# Patient Record
Sex: MALE
[De-identification: names, ages, dates, MRNs, and addresses within clinical notes are randomized per-mention and may not be internally consistent; named-entity substitution may affect disease eponyms.]

---

## 2018-02-05 ENCOUNTER — HOSPITAL ENCOUNTER (OUTPATIENT)
Dept: HOSPITAL 31 - C.SDS | Age: 48
Discharge: HOME | End: 2018-02-05
Attending: SURGERY
Payer: COMMERCIAL

## 2018-02-05 VITALS — BODY MASS INDEX: 25.4 KG/M2

## 2018-02-05 VITALS — SYSTOLIC BLOOD PRESSURE: 100 MMHG | TEMPERATURE: 97 F | DIASTOLIC BLOOD PRESSURE: 82 MMHG | HEART RATE: 68 BPM

## 2018-02-05 VITALS — RESPIRATION RATE: 18 BRPM | OXYGEN SATURATION: 100 %

## 2018-02-05 DIAGNOSIS — D17.6: ICD-10-CM

## 2018-02-05 DIAGNOSIS — K40.20: Primary | ICD-10-CM

## 2018-02-05 PROCEDURE — 55520 REMOVAL OF SPERM CORD LESION: CPT

## 2018-02-05 PROCEDURE — 88304 TISSUE EXAM BY PATHOLOGIST: CPT

## 2018-02-05 PROCEDURE — 49650 LAP ING HERNIA REPAIR INIT: CPT

## 2018-02-05 RX ADMIN — CEFAZOLIN SODIUM ONE MLS: 2 SOLUTION INTRAVENOUS at 07:46

## 2018-02-05 RX ADMIN — LIDOCAINE HYDROCHLORIDE AND EPINEPHRINE ONE ML: 10; 10 INJECTION, SOLUTION INFILTRATION; PERINEURAL at 08:31

## 2018-02-05 RX ADMIN — BUPIVACAINE HYDROCHLORIDE ONE ML: 2.5 INJECTION, SOLUTION EPIDURAL; INFILTRATION; INTRACAUDAL; PERINEURAL at 10:45

## 2018-02-05 RX ADMIN — BUPIVACAINE HYDROCHLORIDE ONE ML: 2.5 INJECTION, SOLUTION EPIDURAL; INFILTRATION; INTRACAUDAL; PERINEURAL at 09:50

## 2018-02-05 RX ADMIN — BUPIVACAINE HYDROCHLORIDE ONE ML: 2.5 INJECTION, SOLUTION EPIDURAL; INFILTRATION; INTRACAUDAL; PERINEURAL at 10:20

## 2018-02-05 RX ADMIN — LIDOCAINE HYDROCHLORIDE AND EPINEPHRINE ONE ML: 10; 10 INJECTION, SOLUTION INFILTRATION; PERINEURAL at 07:45

## 2018-02-05 RX ADMIN — CEFAZOLIN SODIUM ONE MLS: 2 SOLUTION INTRAVENOUS at 08:20

## 2018-02-05 NOTE — PCM.[id number]
Surgeon's Initial Post Op Note





- Surgeon's Notes


Surgeon: Kristin


Assistant:  [id number]


Type of Anesthesia: General Endo, Block Regional


Pre-Operative Diagnosis: Bilateral inguinal hernias


Operative Findings: Bilateral inguinal hernias


Post-Operative Diagnosis: Bilateral inguinal hernias


Operation Performed: Robotic assisted bilateral inguinal hernia repair with mesh


Specimen/Specimens Removed: R cord lipoma


Estimated Blood Loss: EBL {In ML}: 15


Blood Products Given: N/A


Drains Used: No Drains


Post-Op Condition: Good


Date of Surgery/Procedure: 02/05/18


Time of Surgery/Procedure: 08:00

## 2018-02-12 NOTE — OP
PROCEDURE DATE:  02/05/2018



PREOPERATIVE DIAGNOSIS:  Right inguinal hernia, possible left inguinal

hernia.



POSTOPERATIVE DIAGNOSES:

1.  Bilateral inguinal hernia.

2.  Large lipoma of the left inguinal hernia.



PROCEDURES DONE:

1.  Robotic right inguinal hernia repair with mesh.

2.  Robotic left inguinal hernia repair with mesh.

3.  Robotic excision of the lipoma of the spermatic cord on the left side.

4.  Laparoscopic bilateral TAP block placement.



SURGEON:  Juan Foster MD



ASSISTANT:  RAYMOND He



TYPE OF ANESTHESIA:  General endotracheal tube anesthesia.



ESTIMATED BLOOD LOSS:  Around 10 mL.



COMPLICATIONS:  None.



DRAINS:  None.



PATHOLOGY:  Lipoma of the left spermatic cord was sent for Pathology.



COMPLICATIONS:  None.



INTRAOPERATIVE FINDINGS:  The patient had left indirect hernia as well as

very small direct hernia as well as lipoma of the cord and the patient also

had a right indirect inguinal hernia.



DESCRIPTION OF PROCEDURE:  On intraoperative steps, this 48-year-old male

was diagnosed with right inguinal hernia and possible left inguinal hernia.

The patient was brought to the OR, after consenting for the robotic

inguinal hernia repair with the mesh, placed supine on the operating table.

After induction of the anesthesia, abdomen was prepped and draped in the

usual sterile fashion.  A supraumbilical transverse incision was made. 

After incising skin and subcutaneous tissue, the fascia was incised.  A

robotic camera port was placed.  Another three 8-mm port was placed in the

upper abdomen.  The robot was brought in.  Camera arm as well as arm 1 and

arm 2 was docked.  The patient had right inguinal hernia.  The patient also

had a left indirect inguinal hernia with very small direct inguinal hernia

and now the peritoneal dissection was done from the left ASIS up to the

right ASIS.  Peritoneum was dissected.  The midline dissection was done up

to the space of Retzius, first the right side dissection was done.  The

peritoneum was dissected from the lateral abdominal wall and vas deferens

as well as spermatic cord vessels was dissected.  The peritoneal cavity was

completely dissected free and it was reduced back into the peritoneal

cavity.  A proper hemostasis was achieved and now an inferior dissection

was done to the peritoneal reflection, now the left side dissection was

done, and the peritoneum was  from the left abdominal wall, vas

deferens as well as spermatic cord vessels were identified and the patient

had a very small hernia repair that was also reduced back into the

peritoneal cavity.  The patient also had a very small direct hernia and the

patient had a large lipoma of the left spermatic cord that was reduced back

into the peritoneal cavity, excised, and it was sent off the table for the

pathology.  After that, the left and right meshes were implanted.  After

proper implantation of the mesh, the peritoneum was sutured with 2-0 Vicryl

as well as 2-0 Vicryl V-Loc continuous sutures and after that, the robot

was undocked.  Procedure was converted to laparoscopic, laparoscopic

bilateral TAP block was given; the left and right side, 30 mL of Marcaine

was given.  After that, the umbilical port site as well as all other port

sites were closed in 2 layers, the fascia with 0 Vicryl, skin with a 4-0

Monocryl, and dry sterile dressing was applied.  The patient tolerated the

procedure well.  Count of the instrument and gauze was correct.  There was

no apparent complication.  The patient was extubated in the OR and sent to

the postanesthesia care unit in stable condition.





__________________________________________

Juan Foster MD





DD:  02/11/2018 15:51:56

DT:  02/12/2018 0:00:39

Job # [id number]